# Patient Record
Sex: MALE | Race: BLACK OR AFRICAN AMERICAN | Employment: STUDENT | URBAN - METROPOLITAN AREA
[De-identification: names, ages, dates, MRNs, and addresses within clinical notes are randomized per-mention and may not be internally consistent; named-entity substitution may affect disease eponyms.]

---

## 2023-03-03 ENCOUNTER — HOSPITAL ENCOUNTER (EMERGENCY)
Age: 18
Discharge: HOME | End: 2023-03-03
Attending: EMERGENCY MEDICINE
Payer: MEDICAID

## 2023-03-03 VITALS
SYSTOLIC BLOOD PRESSURE: 150 MMHG | RESPIRATION RATE: 18 BRPM | HEART RATE: 89 BPM | WEIGHT: 125 LBS | BODY MASS INDEX: 20.09 KG/M2 | TEMPERATURE: 98 F | HEIGHT: 66 IN | OXYGEN SATURATION: 100 % | DIASTOLIC BLOOD PRESSURE: 99 MMHG

## 2023-03-03 DIAGNOSIS — R42 DIZZINESS: Primary | ICD-10-CM

## 2023-03-03 LAB
ALBUMIN SERPL-MCNC: 4.6 G/DL (ref 3.5–5)
ALBUMIN/GLOB SERPL: 1.6 (ref 1.1–2.2)
ALP SERPL-CCNC: 72 U/L (ref 45–117)
ALT SERPL-CCNC: 15 U/L (ref 12–78)
ANION GAP SERPL CALC-SCNC: 10 MMOL/L (ref 5–15)
AST SERPL W P-5'-P-CCNC: 17 U/L (ref 15–37)
BILIRUB SERPL-MCNC: 0.2 MG/DL (ref 0.2–1)
BUN SERPL-MCNC: 12 MG/DL (ref 6–20)
BUN/CREAT SERPL: 15 (ref 12–20)
CA-I BLD-MCNC: 9.1 MG/DL (ref 8.5–10.1)
CHLORIDE SERPL-SCNC: 106 MMOL/L (ref 97–108)
CO2 SERPL-SCNC: 26 MMOL/L (ref 21–32)
CREAT SERPL-MCNC: 0.79 MG/DL (ref 0.7–1.3)
GLOBULIN SER CALC-MCNC: 2.9 G/DL (ref 2–4)
GLUCOSE SERPL-MCNC: 103 MG/DL (ref 65–100)
POTASSIUM SERPL-SCNC: 3.9 MMOL/L (ref 3.5–5.1)
PROT SERPL-MCNC: 7.5 G/DL (ref 6.4–8.2)
SODIUM SERPL-SCNC: 142 MMOL/L (ref 136–145)
VALPROATE SERPL-MCNC: 144 UG/ML (ref 50–100)

## 2023-03-03 PROCEDURE — 99284 EMERGENCY DEPT VISIT MOD MDM: CPT

## 2023-03-03 PROCEDURE — 80053 COMPREHEN METABOLIC PANEL: CPT

## 2023-03-03 PROCEDURE — 74011250636 HC RX REV CODE- 250/636: Performed by: EMERGENCY MEDICINE

## 2023-03-03 PROCEDURE — 80164 ASSAY DIPROPYLACETIC ACD TOT: CPT

## 2023-03-03 RX ORDER — LACOSAMIDE 200 MG/1
200 TABLET ORAL 2 TIMES DAILY
COMMUNITY

## 2023-03-03 RX ORDER — DIVALPROEX SODIUM 250 MG/1
TABLET, DELAYED RELEASE ORAL 2 TIMES DAILY
COMMUNITY

## 2023-03-03 RX ORDER — MECLIZINE HCL 12.5 MG 12.5 MG/1
25 TABLET ORAL
Status: COMPLETED | OUTPATIENT
Start: 2023-03-03 | End: 2023-03-03

## 2023-03-03 RX ORDER — OXCARBAZEPINE 300 MG/1
TABLET, FILM COATED ORAL 2 TIMES DAILY
COMMUNITY

## 2023-03-03 RX ADMIN — SODIUM CHLORIDE 1000 ML: 9 INJECTION, SOLUTION INTRAVENOUS at 10:09

## 2023-03-03 RX ADMIN — MECLIZINE 25 MG: 12.5 TABLET ORAL at 10:14

## 2023-03-03 NOTE — ED PROVIDER NOTES
[facility] EMERGENCY DEPARTMENT  EMERGENCY DEPARTMENT HISTORY AND PHYSICAL EXAM      Date: 3/3/2023  Patient Name: Henriette Lefort  MRN: [id number]  [place]: 2005  Date of evaluation: 3/3/2023  Provider: Giancarlo Steinberg MD   Note Started: 9:52 AM 3/3/23    HISTORY OF PRESENT ILLNESS     Chief Complaint   Patient presents with    Dizziness       History Provided By: Patient    HPI: Henriette Lefort is a 21 y.o. male with known past medical history significant for seizures presents after feeling dizzy this morning and falling down. Patient states that his morning antiseizure epilepsy medication makes him dizzy so he started taking in the afternoon but then started having seizures in the morning. Patient subsequently went back to taking his medication in the morning and since then has had dizzy spells. He denies any fever, chills, nausea, vomiting, chest pain, shortness of breath, rash, diarrhea, headache, night sweats. Symptoms are mild to moderate. He does arrive via EMS with no treatments prior to arrival.    PAST MEDICAL HISTORY   Past Medical History:  Past Medical History:   Diagnosis Date    Seizures ([address]        Past Surgical History:  History reviewed. No pertinent surgical history. Family History:  History reviewed. No pertinent family history. Social History:  Social History     Tobacco Use    Smoking status: Never    Smokeless tobacco: Never   Substance Use Topics    Alcohol use: Never    Drug use: Never       Allergies: Allergies   Allergen Reactions    Advil [Ibuprofen] Other (comments)       PCP: None    Current Meds:   Previous Medications    DIVALPROEX DR (DEPAKOTE) 250 MG TABLET    Take  by mouth two (2) times a day. LACOSAMIDE (VIMPAT) 200 MG TAB TABLET    Take 200 mg by mouth two (2) times a day. OXCARBAZEPINE (TRILEPTAL) 300 MG TABLET    Take  by mouth two (2) times a day. REVIEW OF SYSTEMS   Review of Systems   Constitutional:  Negative for fever.    Respiratory:  Negative for chest tightness and shortness of breath. Cardiovascular:  Negative for chest pain. Neurological:  Positive for dizziness and light-headedness. Negative for facial asymmetry and headaches. Positives and Pertinent negatives as per HPI. PHYSICAL EXAM     ED Triage Vitals [03/03/23 0940]   ED Encounter Vitals Group      BP (!) 150/99      Pulse (Heart Rate) 89      Resp Rate 18      Temp 98 °F (36.7 °C)      Temp src       O2 Sat (%) 100 %      Weight 125 lb      Height 5' 6\"      Physical Exam  Vitals and nursing note reviewed. Constitutional:       General: He is not in acute distress. Appearance: He is well-developed. HENT:      Head: Normocephalic and atraumatic. Nose: Nose normal.      Mouth/Throat:      Mouth: Mucous membranes are moist.      Pharynx: Oropharynx is clear. No oropharyngeal exudate. Eyes:      General:         Right eye: No discharge. Left eye: No discharge. Conjunctiva/sclera: Conjunctivae normal.      Pupils: Pupils are equal, round, and reactive to light. Cardiovascular:      Rate and Rhythm: Normal rate and regular rhythm. Chest Wall: PMI is not displaced. No thrill. Heart sounds: Normal heart sounds. No murmur heard. No friction rub. No gallop. Pulmonary:      Effort: Pulmonary effort is normal. No respiratory distress. Breath sounds: Normal breath sounds. No wheezing or rales. Chest:      Chest wall: No tenderness. Abdominal:      General: Bowel sounds are normal. There is no distension. Palpations: Abdomen is soft. There is no mass. Tenderness: There is no abdominal tenderness. There is no guarding or rebound. Musculoskeletal:         General: Normal range of motion. Cervical back: Normal range of motion and neck supple. Lymphadenopathy:      Cervical: No cervical adenopathy. Skin:     General: Skin is warm and dry. Capillary Refill: Capillary refill takes less than 2 seconds.       Findings: No erythema or rash. Neurological:      Mental Status: He is alert and oriented to person, place, and time. Cranial Nerves: No cranial nerve deficit. Coordination: Coordination normal.   Psychiatric:         Mood and Affect: Mood normal.         Behavior: Behavior normal.       SCREENINGS        No data recorded        LAB, EKG AND DIAGNOSTIC RESULTS   Labs:  Recent Results (from the past 12 hour(s))   METABOLIC PANEL, COMPREHENSIVE    Collection Time: 03/03/23  9:53 AM   Result Value Ref Range    Sodium 142 136 - 145 mmol/L    Potassium 3.9 3.5 - 5.1 mmol/L    Chloride 106 97 - 108 mmol/L    CO2 26 21 - 32 mmol/L    Anion gap 10 5 - 15 mmol/L    Glucose 103 (H) 65 - 100 mg/dL    BUN 12 6 - 20 mg/dL    Creatinine 0.79 0.70 - 1.30 mg/dL    BUN/Creatinine ratio 15 12 - 20      eGFR >60 >60 ml/min/1.73m2    Calcium 9.1 8.5 - 10.1 mg/dL    Bilirubin, total 0.2 0.2 - 1.0 mg/dL    AST (SGOT) 17 15 - 37 U/L    ALT (SGPT) 15 12 - 78 U/L    Alk. phosphatase 72 45 - 117 U/L    Protein, total 7.5 6.4 - 8.2 g/dL    Albumin 4.6 3.5 - 5.0 g/dL    Globulin 2.9 2.0 - 4.0 g/dL    A-G Ratio 1.6 1.1 - 2.2     VALPROIC ACID    Collection Time: 03/03/23  9:53 AM   Result Value Ref Range    Valproic acid 144 (H) 50 - 100 ug/mL     No results found. PROCEDURES   Unless otherwise noted below, none. Procedures    CRITICAL CARE TIME   None  ED COURSE and DIFFERENTIAL DIAGNOSIS/MDM   CC/HPI Summary, DDx, ED Course, and Reassessment: I reevaluated the patient is no longer dizzy at this point time. His Depakote level is elevated to 144 with normal range up to 100. I have asked him to decrease his dosage today by 1 and then resume tomorrow. I have offered the patient further evaluation and treatment he says he like to go and he like to follow-up with neurology. He does not have other signs or symptoms of valproic acid toxicity to include delirium or tremors or altered sensorium.   Symptoms are mild with regard to the dizziness initially which is now resolved. Disposition Considerations (Tests not done, Shared Decision Making, Pt Expectation of Test or Treatment.):      Vitals:    Vitals:    03/03/23 0940   BP: (!) 150/99   Pulse: 89   Resp: 18   Temp: 98 °F (36.7 °C)   SpO2: 100%   Weight: 56.7 kg (125 lb)   Height: 5' 6\" (1.676 m)             Patient was given the following medications:  Medications   sodium chloride 0.9 % bolus infusion 1,000 mL (1,000 mL IntraVENous New Bag 3/3/23 1009)   meclizine (ANTIVERT) tablet 25 mg (25 mg Oral Given 3/3/23 1014)       CONSULTS: (Who and What was discussed)  None     Social Determinants affecting Dx or Tx: Patient lacks support at home or lives alone. Records Reviewed (source and summary of external notes): None    FINAL IMPRESSION     1. Dizziness          DISPOSITION/PLAN   Discharged    Discharge Note: The patient is stable for discharge home. The signs, symptoms, diagnosis, and discharge instructions have been discussed, understanding conveyed, and agreed upon. The patient is to follow up as recommended or return to ER should their symptoms worsen. PATIENT REFERRED TO:  Follow-up Information       Follow up With Specialties Details Why Contact Info    [facility] Emergency Medicine Call in 2 days As needed, If symptoms worsen [address] [place] [id number] [phone]    Melida Carey MD Neurology Call today As needed, If symptoms worsen [address]  [phone]                DISCHARGE MEDICATIONS:  Current Discharge Medication List            DISCONTINUED MEDICATIONS:  Current Discharge Medication List          I am the Primary Clinician of Record: Gary Jones MD (electronically signed)    (Please note that parts of this dictation were completed with voice recognition software.  Quite often unanticipated grammatical, syntax, homophones, and other interpretive errors are inadvertently transcribed by the computer software. Please disregards these errors.  Please excuse any errors that have escaped final proofreading.)

## 2023-03-03 NOTE — ED TRIAGE NOTES
Pt reports dizziness when getting out of bed this morning. Pt on multiple seizure medications that cause dizziness when he takes in the morning. Reports he has been taking medicines in the afternoon that helps with dizziness, but recently switched back to taking medicines in the morning.

## 2023-03-03 NOTE — DISCHARGE INSTRUCTIONS
Alprolix acid level was elevated today I am asking that you skip your dose today and then resume tomorrow. Please follow-up with neurology for further evaluation and treatment.